# Patient Record
Sex: FEMALE | Race: OTHER | NOT HISPANIC OR LATINO | URBAN - METROPOLITAN AREA
[De-identification: names, ages, dates, MRNs, and addresses within clinical notes are randomized per-mention and may not be internally consistent; named-entity substitution may affect disease eponyms.]

---

## 2019-01-10 ENCOUNTER — OUTPATIENT (OUTPATIENT)
Dept: OUTPATIENT SERVICES | Facility: HOSPITAL | Age: 29
LOS: 1 days | End: 2019-01-10
Payer: COMMERCIAL

## 2019-01-10 VITALS
WEIGHT: 190.04 LBS | HEART RATE: 77 BPM | SYSTOLIC BLOOD PRESSURE: 123 MMHG | OXYGEN SATURATION: 100 % | TEMPERATURE: 98 F | HEIGHT: 61 IN | RESPIRATION RATE: 16 BRPM | DIASTOLIC BLOOD PRESSURE: 77 MMHG

## 2019-01-10 DIAGNOSIS — D25.9 LEIOMYOMA OF UTERUS, UNSPECIFIED: ICD-10-CM

## 2019-01-10 DIAGNOSIS — Z01.818 ENCOUNTER FOR OTHER PREPROCEDURAL EXAMINATION: ICD-10-CM

## 2019-01-10 LAB
ANION GAP SERPL CALC-SCNC: 9 MMOL/L — SIGNIFICANT CHANGE UP (ref 5–17)
APTT BLD: 29 SEC — SIGNIFICANT CHANGE UP (ref 27.5–36.3)
BUN SERPL-MCNC: 17 MG/DL — SIGNIFICANT CHANGE UP (ref 7–18)
CALCIUM SERPL-MCNC: 8.5 MG/DL — SIGNIFICANT CHANGE UP (ref 8.4–10.5)
CHLORIDE SERPL-SCNC: 108 MMOL/L — SIGNIFICANT CHANGE UP (ref 96–108)
CO2 SERPL-SCNC: 23 MMOL/L — SIGNIFICANT CHANGE UP (ref 22–31)
CREAT SERPL-MCNC: 0.75 MG/DL — SIGNIFICANT CHANGE UP (ref 0.5–1.3)
GLUCOSE SERPL-MCNC: 87 MG/DL — SIGNIFICANT CHANGE UP (ref 70–99)
HCT VFR BLD CALC: 31.5 % — LOW (ref 34.5–45)
HGB BLD-MCNC: 8.9 G/DL — LOW (ref 11.5–15.5)
INR BLD: 1.05 RATIO — SIGNIFICANT CHANGE UP (ref 0.88–1.16)
MCHC RBC-ENTMCNC: 18.7 PG — LOW (ref 27–34)
MCHC RBC-ENTMCNC: 28.3 GM/DL — LOW (ref 32–36)
MCV RBC AUTO: 66.1 FL — LOW (ref 80–100)
PLATELET # BLD AUTO: 487 K/UL — HIGH (ref 150–400)
POTASSIUM SERPL-MCNC: 4.4 MMOL/L — SIGNIFICANT CHANGE UP (ref 3.5–5.3)
POTASSIUM SERPL-SCNC: 4.4 MMOL/L — SIGNIFICANT CHANGE UP (ref 3.5–5.3)
PROTHROM AB SERPL-ACNC: 11.7 SEC — SIGNIFICANT CHANGE UP (ref 10–12.9)
RBC # BLD: 4.77 M/UL — SIGNIFICANT CHANGE UP (ref 3.8–5.2)
RBC # FLD: 18.5 % — HIGH (ref 10.3–14.5)
SODIUM SERPL-SCNC: 140 MMOL/L — SIGNIFICANT CHANGE UP (ref 135–145)
WBC # BLD: 9.2 K/UL — SIGNIFICANT CHANGE UP (ref 3.8–10.5)
WBC # FLD AUTO: 9.2 K/UL — SIGNIFICANT CHANGE UP (ref 3.8–10.5)

## 2019-01-10 PROCEDURE — 86850 RBC ANTIBODY SCREEN: CPT

## 2019-01-10 PROCEDURE — 36415 COLL VENOUS BLD VENIPUNCTURE: CPT

## 2019-01-10 PROCEDURE — 85610 PROTHROMBIN TIME: CPT

## 2019-01-10 PROCEDURE — 86900 BLOOD TYPING SEROLOGIC ABO: CPT

## 2019-01-10 PROCEDURE — 85730 THROMBOPLASTIN TIME PARTIAL: CPT

## 2019-01-10 PROCEDURE — 86901 BLOOD TYPING SEROLOGIC RH(D): CPT

## 2019-01-10 PROCEDURE — 85027 COMPLETE CBC AUTOMATED: CPT

## 2019-01-10 PROCEDURE — G0463: CPT

## 2019-01-10 PROCEDURE — 80048 BASIC METABOLIC PNL TOTAL CA: CPT

## 2019-01-10 NOTE — H&P PST ADULT - FAMILY HISTORY
Mother  Still living? Yes, Estimated age: 66  History of hypertension, Age at diagnosis: Age Unknown

## 2019-01-10 NOTE — H&P PST ADULT - RS GEN PE MLT RESP DETAILS PC
good air movement/respirations non-labored/airway patent/clear to auscultation bilaterally/normal/breath sounds equal

## 2019-01-10 NOTE — H&P PST ADULT - NSANTHOSAYNRD_GEN_A_CORE
No. KRYS screening performed.  STOP BANG Legend: 0-2 = LOW Risk; 3-4 = INTERMEDIATE Risk; 5-8 = HIGH Risk

## 2019-01-10 NOTE — H&P PST ADULT - HISTORY OF PRESENT ILLNESS
28 year old Black female in generally good health upon general physical PMD discovered fibroid. Pt was suggested to have a gyn consultation upon this finding. Pt than had sonogram and fibroids were diagnosed. Pt has had normal menstrual cycle until Dec where some associated pain on the first two days of cycle has occurred. Pt is scheduled for abdominal myomectomy on 1/24/2019.

## 2019-04-30 PROBLEM — D25.9 LEIOMYOMA OF UTERUS, UNSPECIFIED: Chronic | Status: ACTIVE | Noted: 2019-01-10

## 2019-04-30 PROBLEM — E66.9 OBESITY, UNSPECIFIED: Chronic | Status: ACTIVE | Noted: 2019-01-10

## 2019-05-22 ENCOUNTER — OUTPATIENT (OUTPATIENT)
Dept: OUTPATIENT SERVICES | Facility: HOSPITAL | Age: 29
LOS: 1 days | End: 2019-05-22
Payer: COMMERCIAL

## 2019-05-22 VITALS
TEMPERATURE: 98 F | OXYGEN SATURATION: 96 % | DIASTOLIC BLOOD PRESSURE: 81 MMHG | HEART RATE: 82 BPM | WEIGHT: 179.9 LBS | RESPIRATION RATE: 18 BRPM | SYSTOLIC BLOOD PRESSURE: 122 MMHG | HEIGHT: 62 IN

## 2019-05-22 DIAGNOSIS — Z01.818 ENCOUNTER FOR OTHER PREPROCEDURAL EXAMINATION: ICD-10-CM

## 2019-05-22 DIAGNOSIS — D25.9 LEIOMYOMA OF UTERUS, UNSPECIFIED: ICD-10-CM

## 2019-05-22 LAB — BLD GP AB SCN SERPL QL: SIGNIFICANT CHANGE UP

## 2019-05-22 PROCEDURE — 36415 COLL VENOUS BLD VENIPUNCTURE: CPT

## 2019-05-22 PROCEDURE — 86900 BLOOD TYPING SEROLOGIC ABO: CPT

## 2019-05-22 PROCEDURE — 86901 BLOOD TYPING SEROLOGIC RH(D): CPT

## 2019-05-22 PROCEDURE — 86850 RBC ANTIBODY SCREEN: CPT

## 2019-05-22 NOTE — H&P PST ADULT - NSICDXPROBLEM_GEN_ALL_CORE_FT
PROBLEM DIAGNOSES  Problem: Leiomyoma of uterus  Assessment and Plan: Patient is scheduled for abdominal myomectomy on 6/5/19

## 2019-05-22 NOTE — H&P PST ADULT - HISTORY OF PRESENT ILLNESS
28year old obese female with pmhx of anemia and leiomyoma of uterus presents with c/o dysmenorrhea, menorrhagia and abnormal vaginal bleeding x 3years. Patient is here today for presurgical testing for scheduled Abdominal Myomectomy on 6/5/19

## 2019-05-22 NOTE — H&P PST ADULT - ATTENDING COMMENTS
29 yo with fibroid uterus. For myomectomy via midline vertical incision. Discussed risks including infection, bleeding, transfusion, injury to intestines, bladder, and ureters. Patient understands risks and agrees to proceed with surgery.

## 2019-05-22 NOTE — H&P PST ADULT - NSICDXFAMILYHX_GEN_ALL_CORE_FT
FAMILY HISTORY:  Mother  Still living? Yes, Estimated age: 66  History of hypertension, Age at diagnosis: Age Unknown

## 2019-06-05 ENCOUNTER — INPATIENT (INPATIENT)
Facility: HOSPITAL | Age: 29
LOS: 2 days | Discharge: HOME | DRG: 742 | End: 2019-06-08
Attending: OBSTETRICS & GYNECOLOGY | Admitting: OBSTETRICS & GYNECOLOGY
Payer: COMMERCIAL

## 2019-06-05 VITALS
HEART RATE: 93 BPM | SYSTOLIC BLOOD PRESSURE: 137 MMHG | WEIGHT: 173.94 LBS | HEIGHT: 62 IN | OXYGEN SATURATION: 97 % | RESPIRATION RATE: 16 BRPM | DIASTOLIC BLOOD PRESSURE: 71 MMHG | TEMPERATURE: 98 F

## 2019-06-05 DIAGNOSIS — D25.9 LEIOMYOMA OF UTERUS, UNSPECIFIED: ICD-10-CM

## 2019-06-05 LAB
ANION GAP SERPL CALC-SCNC: 7 MMOL/L — SIGNIFICANT CHANGE UP (ref 5–17)
BASOPHILS # BLD AUTO: 0 K/UL — SIGNIFICANT CHANGE UP (ref 0–0.2)
BASOPHILS # BLD AUTO: 0.04 K/UL — SIGNIFICANT CHANGE UP (ref 0–0.2)
BASOPHILS NFR BLD AUTO: 0 % — SIGNIFICANT CHANGE UP (ref 0–2)
BASOPHILS NFR BLD AUTO: 0.1 % — SIGNIFICANT CHANGE UP (ref 0–2)
BLD GP AB SCN SERPL QL: SIGNIFICANT CHANGE UP
BUN SERPL-MCNC: 13 MG/DL — SIGNIFICANT CHANGE UP (ref 7–18)
CALCIUM SERPL-MCNC: 7.4 MG/DL — LOW (ref 8.4–10.5)
CHLORIDE SERPL-SCNC: 113 MMOL/L — HIGH (ref 96–108)
CO2 SERPL-SCNC: 21 MMOL/L — LOW (ref 22–31)
CREAT SERPL-MCNC: 0.89 MG/DL — SIGNIFICANT CHANGE UP (ref 0.5–1.3)
EOSINOPHIL # BLD AUTO: 0 K/UL — SIGNIFICANT CHANGE UP (ref 0–0.5)
EOSINOPHIL # BLD AUTO: 0.24 K/UL — SIGNIFICANT CHANGE UP (ref 0–0.5)
EOSINOPHIL NFR BLD AUTO: 0 % — SIGNIFICANT CHANGE UP (ref 0–6)
EOSINOPHIL NFR BLD AUTO: 1 % — SIGNIFICANT CHANGE UP (ref 0–6)
GLUCOSE SERPL-MCNC: 211 MG/DL — HIGH (ref 70–99)
HCG UR QL: NEGATIVE — SIGNIFICANT CHANGE UP
HCT VFR BLD CALC: 22.8 % — LOW (ref 34.5–45)
HCT VFR BLD CALC: 24.4 % — LOW (ref 34.5–45)
HGB BLD-MCNC: 7.5 G/DL — LOW (ref 11.5–15.5)
HGB BLD-MCNC: 8.2 G/DL — LOW (ref 11.5–15.5)
IMM GRANULOCYTES NFR BLD AUTO: 0.5 % — SIGNIFICANT CHANGE UP (ref 0–1.5)
LYMPHOCYTES # BLD AUTO: 0.94 K/UL — LOW (ref 1–3.3)
LYMPHOCYTES # BLD AUTO: 1.46 K/UL — SIGNIFICANT CHANGE UP (ref 1–3.3)
LYMPHOCYTES # BLD AUTO: 3.4 % — LOW (ref 13–44)
LYMPHOCYTES # BLD AUTO: 6 % — LOW (ref 13–44)
MCHC RBC-ENTMCNC: 25.8 PG — LOW (ref 27–34)
MCHC RBC-ENTMCNC: 25.9 PG — LOW (ref 27–34)
MCHC RBC-ENTMCNC: 32.9 GM/DL — SIGNIFICANT CHANGE UP (ref 32–36)
MCHC RBC-ENTMCNC: 33.6 GM/DL — SIGNIFICANT CHANGE UP (ref 32–36)
MCV RBC AUTO: 76.7 FL — LOW (ref 80–100)
MCV RBC AUTO: 78.6 FL — LOW (ref 80–100)
MONOCYTES # BLD AUTO: 0.97 K/UL — HIGH (ref 0–0.9)
MONOCYTES # BLD AUTO: 1.8 K/UL — HIGH (ref 0–0.9)
MONOCYTES NFR BLD AUTO: 4 % — SIGNIFICANT CHANGE UP (ref 2–14)
MONOCYTES NFR BLD AUTO: 6.5 % — SIGNIFICANT CHANGE UP (ref 2–14)
NEUTROPHILS # BLD AUTO: 20.67 K/UL — HIGH (ref 1.8–7.4)
NEUTROPHILS # BLD AUTO: 24.63 K/UL — HIGH (ref 1.8–7.4)
NEUTROPHILS NFR BLD AUTO: 83 % — HIGH (ref 43–77)
NEUTROPHILS NFR BLD AUTO: 89.5 % — HIGH (ref 43–77)
NRBC # BLD: 0 /100 WBCS — SIGNIFICANT CHANGE UP (ref 0–0)
PLATELET # BLD AUTO: 255 K/UL — SIGNIFICANT CHANGE UP (ref 150–400)
PLATELET # BLD AUTO: 264 K/UL — SIGNIFICANT CHANGE UP (ref 150–400)
POTASSIUM SERPL-MCNC: 3.9 MMOL/L — SIGNIFICANT CHANGE UP (ref 3.5–5.3)
POTASSIUM SERPL-SCNC: 3.9 MMOL/L — SIGNIFICANT CHANGE UP (ref 3.5–5.3)
RBC # BLD: 2.9 M/UL — LOW (ref 3.8–5.2)
RBC # BLD: 3.18 M/UL — LOW (ref 3.8–5.2)
RBC # FLD: 18.2 % — HIGH (ref 10.3–14.5)
RBC # FLD: 18.6 % — HIGH (ref 10.3–14.5)
SODIUM SERPL-SCNC: 141 MMOL/L — SIGNIFICANT CHANGE UP (ref 135–145)
WBC # BLD: 24.32 K/UL — HIGH (ref 3.8–10.5)
WBC # BLD: 27.55 K/UL — HIGH (ref 3.8–10.5)
WBC # FLD AUTO: 24.32 K/UL — HIGH (ref 3.8–10.5)
WBC # FLD AUTO: 27.55 K/UL — HIGH (ref 3.8–10.5)

## 2019-06-05 PROCEDURE — 58146 MYOMECTOMY ABDOM COMPLEX: CPT | Mod: AS

## 2019-06-05 RX ORDER — KETOROLAC TROMETHAMINE 30 MG/ML
30 SYRINGE (ML) INJECTION
Refills: 0 | Status: DISCONTINUED | OUTPATIENT
Start: 2019-06-05 | End: 2019-06-06

## 2019-06-05 RX ORDER — HYDROMORPHONE HYDROCHLORIDE 2 MG/ML
0.5 INJECTION INTRAMUSCULAR; INTRAVENOUS; SUBCUTANEOUS
Refills: 0 | Status: DISCONTINUED | OUTPATIENT
Start: 2019-06-05 | End: 2019-06-05

## 2019-06-05 RX ORDER — ACETAMINOPHEN 500 MG
1000 TABLET ORAL ONCE
Refills: 0 | Status: COMPLETED | OUTPATIENT
Start: 2019-06-05 | End: 2019-06-05

## 2019-06-05 RX ORDER — SODIUM CHLORIDE 9 MG/ML
500 INJECTION INTRAMUSCULAR; INTRAVENOUS; SUBCUTANEOUS ONCE
Refills: 0 | Status: COMPLETED | OUTPATIENT
Start: 2019-06-05 | End: 2019-06-05

## 2019-06-05 RX ORDER — SENNA PLUS 8.6 MG/1
2 TABLET ORAL AT BEDTIME
Refills: 0 | Status: DISCONTINUED | OUTPATIENT
Start: 2019-06-05 | End: 2019-06-08

## 2019-06-05 RX ORDER — ZOLPIDEM TARTRATE 10 MG/1
5 TABLET ORAL AT BEDTIME
Refills: 0 | Status: DISCONTINUED | OUTPATIENT
Start: 2019-06-05 | End: 2019-06-08

## 2019-06-05 RX ORDER — PANTOPRAZOLE SODIUM 20 MG/1
40 TABLET, DELAYED RELEASE ORAL
Refills: 0 | Status: DISCONTINUED | OUTPATIENT
Start: 2019-06-05 | End: 2019-06-08

## 2019-06-05 RX ORDER — HYDROMORPHONE HYDROCHLORIDE 2 MG/ML
1 INJECTION INTRAMUSCULAR; INTRAVENOUS; SUBCUTANEOUS
Refills: 0 | Status: DISCONTINUED | OUTPATIENT
Start: 2019-06-05 | End: 2019-06-05

## 2019-06-05 RX ORDER — METOCLOPRAMIDE HCL 10 MG
10 TABLET ORAL ONCE
Refills: 0 | Status: DISCONTINUED | OUTPATIENT
Start: 2019-06-05 | End: 2019-06-05

## 2019-06-05 RX ORDER — ACETAMINOPHEN 500 MG
1000 TABLET ORAL ONCE
Refills: 0 | Status: COMPLETED | OUTPATIENT
Start: 2019-06-06 | End: 2019-06-06

## 2019-06-05 RX ORDER — ONDANSETRON 8 MG/1
4 TABLET, FILM COATED ORAL ONCE
Refills: 0 | Status: DISCONTINUED | OUTPATIENT
Start: 2019-06-05 | End: 2019-06-05

## 2019-06-05 RX ORDER — SIMETHICONE 80 MG/1
80 TABLET, CHEWABLE ORAL EVERY 6 HOURS
Refills: 0 | Status: DISCONTINUED | OUTPATIENT
Start: 2019-06-05 | End: 2019-06-08

## 2019-06-05 RX ORDER — INDOMETHACIN 50 MG
25 CAPSULE ORAL
Refills: 0 | Status: COMPLETED | OUTPATIENT
Start: 2019-06-06 | End: 2019-06-07

## 2019-06-05 RX ORDER — MORPHINE SULFATE 50 MG/1
4 CAPSULE, EXTENDED RELEASE ORAL EVERY 4 HOURS
Refills: 0 | Status: DISCONTINUED | OUTPATIENT
Start: 2019-06-05 | End: 2019-06-06

## 2019-06-05 RX ORDER — MAGNESIUM HYDROXIDE 400 MG/1
30 TABLET, CHEWABLE ORAL DAILY
Refills: 0 | Status: DISCONTINUED | OUTPATIENT
Start: 2019-06-05 | End: 2019-06-08

## 2019-06-05 RX ORDER — SODIUM CHLORIDE 9 MG/ML
3 INJECTION INTRAMUSCULAR; INTRAVENOUS; SUBCUTANEOUS EVERY 8 HOURS
Refills: 0 | Status: DISCONTINUED | OUTPATIENT
Start: 2019-06-05 | End: 2019-06-05

## 2019-06-05 RX ORDER — SODIUM CHLORIDE 9 MG/ML
1000 INJECTION, SOLUTION INTRAVENOUS
Refills: 0 | Status: DISCONTINUED | OUTPATIENT
Start: 2019-06-05 | End: 2019-06-05

## 2019-06-05 RX ORDER — ASCORBIC ACID 60 MG
500 TABLET,CHEWABLE ORAL DAILY
Refills: 0 | Status: DISCONTINUED | OUTPATIENT
Start: 2019-06-05 | End: 2019-06-08

## 2019-06-05 RX ORDER — DOCUSATE SODIUM 100 MG
100 CAPSULE ORAL
Refills: 0 | Status: DISCONTINUED | OUTPATIENT
Start: 2019-06-05 | End: 2019-06-08

## 2019-06-05 RX ORDER — SODIUM CHLORIDE 9 MG/ML
1000 INJECTION INTRAMUSCULAR; INTRAVENOUS; SUBCUTANEOUS ONCE
Refills: 0 | Status: COMPLETED | OUTPATIENT
Start: 2019-06-05 | End: 2019-06-05

## 2019-06-05 RX ORDER — FERROUS SULFATE 325(65) MG
325 TABLET ORAL THREE TIMES A DAY
Refills: 0 | Status: DISCONTINUED | OUTPATIENT
Start: 2019-06-05 | End: 2019-06-08

## 2019-06-05 RX ADMIN — Medication 500 MILLIGRAM(S): at 18:45

## 2019-06-05 RX ADMIN — Medication 1000 MILLIGRAM(S): at 12:20

## 2019-06-05 RX ADMIN — SENNA PLUS 2 TABLET(S): 8.6 TABLET ORAL at 21:27

## 2019-06-05 RX ADMIN — Medication 400 MILLIGRAM(S): at 11:36

## 2019-06-05 RX ADMIN — SODIUM CHLORIDE 3 MILLILITER(S): 9 INJECTION INTRAMUSCULAR; INTRAVENOUS; SUBCUTANEOUS at 07:03

## 2019-06-05 RX ADMIN — SIMETHICONE 80 MILLIGRAM(S): 80 TABLET, CHEWABLE ORAL at 18:45

## 2019-06-05 RX ADMIN — Medication 30 MILLIGRAM(S): at 23:13

## 2019-06-05 RX ADMIN — HYDROMORPHONE HYDROCHLORIDE 1 MILLIGRAM(S): 2 INJECTION INTRAMUSCULAR; INTRAVENOUS; SUBCUTANEOUS at 14:31

## 2019-06-05 RX ADMIN — HYDROMORPHONE HYDROCHLORIDE 0.5 MILLIGRAM(S): 2 INJECTION INTRAMUSCULAR; INTRAVENOUS; SUBCUTANEOUS at 12:35

## 2019-06-05 RX ADMIN — HYDROMORPHONE HYDROCHLORIDE 0.5 MILLIGRAM(S): 2 INJECTION INTRAMUSCULAR; INTRAVENOUS; SUBCUTANEOUS at 12:20

## 2019-06-05 RX ADMIN — MORPHINE SULFATE 4 MILLIGRAM(S): 50 CAPSULE, EXTENDED RELEASE ORAL at 21:54

## 2019-06-05 RX ADMIN — HYDROMORPHONE HYDROCHLORIDE 1 MILLIGRAM(S): 2 INJECTION INTRAMUSCULAR; INTRAVENOUS; SUBCUTANEOUS at 16:57

## 2019-06-05 RX ADMIN — SIMETHICONE 80 MILLIGRAM(S): 80 TABLET, CHEWABLE ORAL at 23:14

## 2019-06-05 RX ADMIN — Medication 400 MILLIGRAM(S): at 18:45

## 2019-06-05 RX ADMIN — MAGNESIUM HYDROXIDE 30 MILLILITER(S): 400 TABLET, CHEWABLE ORAL at 18:45

## 2019-06-05 RX ADMIN — SODIUM CHLORIDE 1000 MILLILITER(S): 9 INJECTION INTRAMUSCULAR; INTRAVENOUS; SUBCUTANEOUS at 14:31

## 2019-06-05 RX ADMIN — Medication 1000 MILLIGRAM(S): at 19:15

## 2019-06-05 RX ADMIN — MORPHINE SULFATE 4 MILLIGRAM(S): 50 CAPSULE, EXTENDED RELEASE ORAL at 21:24

## 2019-06-05 RX ADMIN — Medication 30 MILLIGRAM(S): at 23:43

## 2019-06-05 RX ADMIN — HYDROMORPHONE HYDROCHLORIDE 0.5 MILLIGRAM(S): 2 INJECTION INTRAMUSCULAR; INTRAVENOUS; SUBCUTANEOUS at 11:35

## 2019-06-05 RX ADMIN — Medication 100 MILLIGRAM(S): at 18:45

## 2019-06-05 RX ADMIN — HYDROMORPHONE HYDROCHLORIDE 1 MILLIGRAM(S): 2 INJECTION INTRAMUSCULAR; INTRAVENOUS; SUBCUTANEOUS at 16:29

## 2019-06-05 RX ADMIN — HYDROMORPHONE HYDROCHLORIDE 0.5 MILLIGRAM(S): 2 INJECTION INTRAMUSCULAR; INTRAVENOUS; SUBCUTANEOUS at 12:50

## 2019-06-05 RX ADMIN — Medication 325 MILLIGRAM(S): at 21:27

## 2019-06-05 RX ADMIN — SODIUM CHLORIDE 2000 MILLILITER(S): 9 INJECTION INTRAMUSCULAR; INTRAVENOUS; SUBCUTANEOUS at 16:00

## 2019-06-05 NOTE — CHART NOTE - NSCHARTNOTEFT_GEN_A_CORE
pt seen in pacu bed 2 w dr velasquez  pt alert and oriented x3  c/o incisional pain    rpt cbc at 4pm                       7.5    27.55 )-----------( 255      ( 05 Jun 2019 16:05 )             22.8     Vital Signs Last 24 Hrs  T(C): 36.9 (05 Jun 2019 11:20), Max: 36.9 (05 Jun 2019 11:20)  T(F): 98.4 (05 Jun 2019 11:20), Max: 98.4 (05 Jun 2019 11:20)  HR: 117 (05 Jun 2019 16:30) (93 - 147)  BP: 104/67 (05 Jun 2019 16:30) (92/51 - 137/71)  BP(mean): 77 (05 Jun 2019 16:30) (63 - 81)  RR: 15 (05 Jun 2019 16:30) (13 - 20)  SpO2: 100% (05 Jun 2019 16:30) (96% - 100%)    pt alert and oriented x3 nad  abd dressing c/d abd soft non distended  pelvic no active vag bleed scant on pad dark red  +venodyne boots b/l                          7.5    27.55 )-----------( 255      ( 05 Jun 2019 16:05 )             22.8   06-05    141  |  113<H>  |  13  ----------------------------<  211<H>  3.9   |  21<L>  |  0.89    Ca    7.4<L>      05 Jun 2019 11:48    POD#0 s/p abd myomectomy  ebl 1000ml  symptomatic anemia post op cause  -2units prbc ordered as instructed by dr velasquez  -consent for blood transfusion

## 2019-06-06 DIAGNOSIS — D50.0 IRON DEFICIENCY ANEMIA SECONDARY TO BLOOD LOSS (CHRONIC): ICD-10-CM

## 2019-06-06 DIAGNOSIS — Z98.890 OTHER SPECIFIED POSTPROCEDURAL STATES: ICD-10-CM

## 2019-06-06 LAB
ANION GAP SERPL CALC-SCNC: 7 MMOL/L — SIGNIFICANT CHANGE UP (ref 5–17)
BASOPHILS # BLD AUTO: 0.01 K/UL — SIGNIFICANT CHANGE UP (ref 0–0.2)
BASOPHILS # BLD AUTO: 0.03 K/UL — SIGNIFICANT CHANGE UP (ref 0–0.2)
BASOPHILS NFR BLD AUTO: 0.1 % — SIGNIFICANT CHANGE UP (ref 0–2)
BASOPHILS NFR BLD AUTO: 0.2 % — SIGNIFICANT CHANGE UP (ref 0–2)
BUN SERPL-MCNC: 10 MG/DL — SIGNIFICANT CHANGE UP (ref 7–18)
CALCIUM SERPL-MCNC: 7.4 MG/DL — LOW (ref 8.4–10.5)
CHLORIDE SERPL-SCNC: 109 MMOL/L — HIGH (ref 96–108)
CO2 SERPL-SCNC: 24 MMOL/L — SIGNIFICANT CHANGE UP (ref 22–31)
CREAT SERPL-MCNC: 0.87 MG/DL — SIGNIFICANT CHANGE UP (ref 0.5–1.3)
EOSINOPHIL # BLD AUTO: 0.02 K/UL — SIGNIFICANT CHANGE UP (ref 0–0.5)
EOSINOPHIL # BLD AUTO: 0.02 K/UL — SIGNIFICANT CHANGE UP (ref 0–0.5)
EOSINOPHIL NFR BLD AUTO: 0.1 % — SIGNIFICANT CHANGE UP (ref 0–6)
EOSINOPHIL NFR BLD AUTO: 0.1 % — SIGNIFICANT CHANGE UP (ref 0–6)
GLUCOSE SERPL-MCNC: 127 MG/DL — HIGH (ref 70–99)
HCT VFR BLD CALC: 17.8 % — CRITICAL LOW (ref 34.5–45)
HCT VFR BLD CALC: 27.4 % — LOW (ref 34.5–45)
HGB BLD-MCNC: 6.1 G/DL — CRITICAL LOW (ref 11.5–15.5)
HGB BLD-MCNC: 9.1 G/DL — LOW (ref 11.5–15.5)
IMM GRANULOCYTES NFR BLD AUTO: 0.4 % — SIGNIFICANT CHANGE UP (ref 0–1.5)
IMM GRANULOCYTES NFR BLD AUTO: 0.6 % — SIGNIFICANT CHANGE UP (ref 0–1.5)
LYMPHOCYTES # BLD AUTO: 13.1 % — SIGNIFICANT CHANGE UP (ref 13–44)
LYMPHOCYTES # BLD AUTO: 18.3 % — SIGNIFICANT CHANGE UP (ref 13–44)
LYMPHOCYTES # BLD AUTO: 2.38 K/UL — SIGNIFICANT CHANGE UP (ref 1–3.3)
LYMPHOCYTES # BLD AUTO: 2.63 K/UL — SIGNIFICANT CHANGE UP (ref 1–3.3)
MCHC RBC-ENTMCNC: 25.6 PG — LOW (ref 27–34)
MCHC RBC-ENTMCNC: 26.3 PG — LOW (ref 27–34)
MCHC RBC-ENTMCNC: 33.2 GM/DL — SIGNIFICANT CHANGE UP (ref 32–36)
MCHC RBC-ENTMCNC: 34.3 GM/DL — SIGNIFICANT CHANGE UP (ref 32–36)
MCV RBC AUTO: 76.7 FL — LOW (ref 80–100)
MCV RBC AUTO: 77 FL — LOW (ref 80–100)
MONOCYTES # BLD AUTO: 1.04 K/UL — HIGH (ref 0–0.9)
MONOCYTES # BLD AUTO: 1.56 K/UL — HIGH (ref 0–0.9)
MONOCYTES NFR BLD AUTO: 7.2 % — SIGNIFICANT CHANGE UP (ref 2–14)
MONOCYTES NFR BLD AUTO: 8.6 % — SIGNIFICANT CHANGE UP (ref 2–14)
NEUTROPHILS # BLD AUTO: 10.59 K/UL — HIGH (ref 1.8–7.4)
NEUTROPHILS # BLD AUTO: 14.15 K/UL — HIGH (ref 1.8–7.4)
NEUTROPHILS NFR BLD AUTO: 73.7 % — SIGNIFICANT CHANGE UP (ref 43–77)
NEUTROPHILS NFR BLD AUTO: 77.6 % — HIGH (ref 43–77)
NRBC # BLD: 0 /100 WBCS — SIGNIFICANT CHANGE UP (ref 0–0)
NRBC # BLD: 0 /100 WBCS — SIGNIFICANT CHANGE UP (ref 0–0)
PLATELET # BLD AUTO: 144 K/UL — LOW (ref 150–400)
PLATELET # BLD AUTO: 195 K/UL — SIGNIFICANT CHANGE UP (ref 150–400)
POTASSIUM SERPL-MCNC: 4 MMOL/L — SIGNIFICANT CHANGE UP (ref 3.5–5.3)
POTASSIUM SERPL-SCNC: 4 MMOL/L — SIGNIFICANT CHANGE UP (ref 3.5–5.3)
RBC # BLD: 2.32 M/UL — LOW (ref 3.8–5.2)
RBC # BLD: 3.56 M/UL — LOW (ref 3.8–5.2)
RBC # FLD: 17.2 % — HIGH (ref 10.3–14.5)
RBC # FLD: 17.5 % — HIGH (ref 10.3–14.5)
SODIUM SERPL-SCNC: 140 MMOL/L — SIGNIFICANT CHANGE UP (ref 135–145)
TSH SERPL-MCNC: 1.01 UU/ML — SIGNIFICANT CHANGE UP (ref 0.34–4.82)
WBC # BLD: 14.37 K/UL — HIGH (ref 3.8–10.5)
WBC # BLD: 18.22 K/UL — HIGH (ref 3.8–10.5)
WBC # FLD AUTO: 14.37 K/UL — HIGH (ref 3.8–10.5)
WBC # FLD AUTO: 18.22 K/UL — HIGH (ref 3.8–10.5)

## 2019-06-06 PROCEDURE — 93010 ELECTROCARDIOGRAM REPORT: CPT

## 2019-06-06 PROCEDURE — 71275 CT ANGIOGRAPHY CHEST: CPT | Mod: 26

## 2019-06-06 RX ORDER — OXYCODONE HYDROCHLORIDE 5 MG/1
10 TABLET ORAL EVERY 4 HOURS
Refills: 0 | Status: DISCONTINUED | OUTPATIENT
Start: 2019-06-06 | End: 2019-06-08

## 2019-06-06 RX ORDER — OXYCODONE HYDROCHLORIDE 5 MG/1
5 TABLET ORAL
Refills: 0 | Status: DISCONTINUED | OUTPATIENT
Start: 2019-06-06 | End: 2019-06-08

## 2019-06-06 RX ORDER — ACETAMINOPHEN 500 MG
975 TABLET ORAL EVERY 8 HOURS
Refills: 0 | Status: DISCONTINUED | OUTPATIENT
Start: 2019-06-06 | End: 2019-06-08

## 2019-06-06 RX ORDER — SODIUM CHLORIDE 9 MG/ML
1000 INJECTION, SOLUTION INTRAVENOUS
Refills: 0 | Status: DISCONTINUED | OUTPATIENT
Start: 2019-06-06 | End: 2019-06-08

## 2019-06-06 RX ADMIN — OXYCODONE HYDROCHLORIDE 10 MILLIGRAM(S): 5 TABLET ORAL at 18:50

## 2019-06-06 RX ADMIN — Medication 30 MILLIGRAM(S): at 04:36

## 2019-06-06 RX ADMIN — OXYCODONE HYDROCHLORIDE 5 MILLIGRAM(S): 5 TABLET ORAL at 19:43

## 2019-06-06 RX ADMIN — Medication 975 MILLIGRAM(S): at 16:40

## 2019-06-06 RX ADMIN — MORPHINE SULFATE 4 MILLIGRAM(S): 50 CAPSULE, EXTENDED RELEASE ORAL at 09:43

## 2019-06-06 RX ADMIN — SIMETHICONE 80 MILLIGRAM(S): 80 TABLET, CHEWABLE ORAL at 06:33

## 2019-06-06 RX ADMIN — SENNA PLUS 2 TABLET(S): 8.6 TABLET ORAL at 21:39

## 2019-06-06 RX ADMIN — Medication 25 MILLIGRAM(S): at 12:08

## 2019-06-06 RX ADMIN — OXYCODONE HYDROCHLORIDE 5 MILLIGRAM(S): 5 TABLET ORAL at 20:52

## 2019-06-06 RX ADMIN — Medication 1000 MILLIGRAM(S): at 06:51

## 2019-06-06 RX ADMIN — Medication 325 MILLIGRAM(S): at 21:39

## 2019-06-06 RX ADMIN — MORPHINE SULFATE 4 MILLIGRAM(S): 50 CAPSULE, EXTENDED RELEASE ORAL at 08:17

## 2019-06-06 RX ADMIN — Medication 25 MILLIGRAM(S): at 17:36

## 2019-06-06 RX ADMIN — Medication 400 MILLIGRAM(S): at 06:34

## 2019-06-06 RX ADMIN — Medication 100 MILLIGRAM(S): at 17:36

## 2019-06-06 RX ADMIN — Medication 30 MILLIGRAM(S): at 05:06

## 2019-06-06 RX ADMIN — MORPHINE SULFATE 4 MILLIGRAM(S): 50 CAPSULE, EXTENDED RELEASE ORAL at 02:21

## 2019-06-06 RX ADMIN — MORPHINE SULFATE 4 MILLIGRAM(S): 50 CAPSULE, EXTENDED RELEASE ORAL at 01:51

## 2019-06-06 RX ADMIN — Medication 1 TABLET(S): at 12:09

## 2019-06-06 RX ADMIN — Medication 500 MILLIGRAM(S): at 12:08

## 2019-06-06 RX ADMIN — SIMETHICONE 80 MILLIGRAM(S): 80 TABLET, CHEWABLE ORAL at 17:36

## 2019-06-06 RX ADMIN — Medication 25 MILLIGRAM(S): at 18:50

## 2019-06-06 RX ADMIN — Medication 25 MILLIGRAM(S): at 21:39

## 2019-06-06 RX ADMIN — PANTOPRAZOLE SODIUM 40 MILLIGRAM(S): 20 TABLET, DELAYED RELEASE ORAL at 06:33

## 2019-06-06 RX ADMIN — Medication 100 MILLIGRAM(S): at 06:33

## 2019-06-06 RX ADMIN — SIMETHICONE 80 MILLIGRAM(S): 80 TABLET, CHEWABLE ORAL at 12:09

## 2019-06-06 RX ADMIN — OXYCODONE HYDROCHLORIDE 10 MILLIGRAM(S): 5 TABLET ORAL at 17:36

## 2019-06-06 RX ADMIN — Medication 325 MILLIGRAM(S): at 06:33

## 2019-06-06 RX ADMIN — Medication 25 MILLIGRAM(S): at 14:20

## 2019-06-06 RX ADMIN — OXYCODONE HYDROCHLORIDE 10 MILLIGRAM(S): 5 TABLET ORAL at 13:18

## 2019-06-06 RX ADMIN — Medication 975 MILLIGRAM(S): at 21:41

## 2019-06-06 RX ADMIN — Medication 325 MILLIGRAM(S): at 15:02

## 2019-06-06 RX ADMIN — OXYCODONE HYDROCHLORIDE 10 MILLIGRAM(S): 5 TABLET ORAL at 12:09

## 2019-06-06 RX ADMIN — Medication 975 MILLIGRAM(S): at 15:02

## 2019-06-06 NOTE — PROGRESS NOTE ADULT - SUBJECTIVE AND OBJECTIVE BOX
POD 1  Doing well  No n/v    vss afeb at 8 AM today  abd:c/d/i, ND  ext:NT    hg 9.1 at 6 AM today    a/p:stable. ambulate. inc spirometer, reg diet

## 2019-06-06 NOTE — CHART NOTE - NSCHARTNOTEFT_GEN_A_CORE
Complete Blood Count + Automated Diff (06.06.19 @ 20:31)    WBC Count: 14.37 K/uL    RBC Count: 2.32 M/uL    Hemoglobin: 6.1: TYPE:(C=Critical, N=Notification, A=Abnormal) _C  TESTS: _HGB/HCT  DATE/TIME CALLED: _06/06/19 20:47  06/06/19 20:51 2ND CALL  CALLED TO: BETHANY ZAFAR  READ BACK (2 Patient Identifiers)(Y/N): _Y  READ BACK VALUES (Y/N): _Y  CALLED BY: ERVIN, 06/06/19 20:52 g/dL    Hematocrit: 17.8: Test repeated. %    Mean Cell Volume: 76.7 fl    Mean Cell Hemoglobin: 26.3 pg    Mean Cell Hemoglobin Conc: 34.3 gm/dL    Red Cell Distrib Width: 17.5 %    Platelet Count - Automated: 144 K/uL    Auto Neutrophil #: 10.59 K/uL    Auto Lymphocyte #: 2.63 K/uL    Auto Monocyte #: 1.04 K/uL    Auto Eosinophil #: 0.02 K/uL    Auto Basophil #: 0.01 K/uL    Auto Neutrophil %: 73.7: Differential percentages must be correlated with absolute numbers for  clinical significance. %    Auto Lymphocyte %: 18.3 %    Auto Monocyte %: 7.2 %    Auto Eosinophil %: 0.1 %    Auto Basophil %: 0.1 %    Auto Immature Granulocyte %: 0.6 %    Nucleated RBC: 0 /100 WBCs    < from: CT Angio Chest w/ IV Cont (06.06.19 @ 21:18) >    EXAM DATE/TIME:    6/6/2019 8:59 PM     CLINICAL HISTORY:    28 years old, female; Signs and symptoms; Tachypnea and other:   Tachycardia;   Prior surgery; Surgery date: Post-operative (0-2 days); Surgery type: S/P   abdominal myomectomy 6/4/19     TECHNIQUE:    Imaging protocol: Axial computed tomographic angiography images of the   chest   with intravenous contrast using CT angiography protocol. Coronal and   sagittal   reformatted images were created and reviewed.    3D rendering: MIP reconstructed images were created and reviewed.    Contrast material: OMNI 350; Contrast volume: 56 ml; Contrast route: IV;     COMPARISON:    No relevant prior studies available.     FINDINGS:    Pulmonary arteries: Adequate contrast enhancement of the pulmonary   arteries.    Aorta: No evidence of thoracic aortic dissection.    Lungs: No evidence endobronchial lesion. Mild-moderate bilateral lower   lobe   air space opacity-atelectasis/scarring.    Pleural space: No evidence of pneumothorax.    Heart: Heart appears within normal limits, no pericardial effusion. No   evidence of filling defects in the cardiac chambers.    Lymph nodes: Unremarkable. No enlarged lymph nodes.    Bones/joints: Musculoskeletal structures appear intact.    Soft tissues: Unremarkable.     IMPRESSION:   1. No evidence of pulmonary embolism.   2. No evidence of thoracic aortic dissection.   3. Mild-moderate bilateral lower lobeair space   opacity-atelectasis/scarring.    < end of copied text >    Spoke with Dr Armendariz instructed to transfuse 3units PRBC and 1 FFP.   CTA negative for PE  cont close monitor  Dr Loredo notified

## 2019-06-06 NOTE — PROGRESS NOTE ADULT - PROBLEM SELECTOR PLAN 1
A/P: 27y/o s/p abdominal myomectomy with acute blood loss anemia, s/p 2units PRBCs, stable POD#1  -cont pain management, postop care  -rpt cbc in am  -iron tid  -advance diet to regular after flatus  -oob, encourage ambulation  -lovenox DVT ppx  -f/u void, incentive spirometry   -d/w Dr. Armendariz A/P: 27y/o s/p abdominal myomectomy with acute blood loss anemia, s/p 2units PRBCs, stable POD#1  -cont pain management, postop care  -rpt cbc in am  -iron tid  -advance diet to regular after flatus  -oob, encourage ambulation  -venodynes while in bed  -f/u void, incentive spirometry   -d/w Dr. Armendariz

## 2019-06-06 NOTE — PROGRESS NOTE ADULT - ASSESSMENT
A/P: 29y/o s/p abdominal myomectomy with acute blood loss anemia, s/p 2units PRBCs, stable POD#1  -cont pain management, postop care  -rpt cbc in am  -iron tid  -advance diet to regular after flatus  -oob, encourage ambulation  -lovenox DVT ppx  -f/u void, incentive spirometry   -d/w Dr. Armendariz A/P: 29y/o s/p abdominal myomectomy with acute blood loss anemia, s/p 2units PRBCs, stable POD#1  -cont pain management, postop care  -rpt cbc in am  -iron tid  -advance diet to regular after flatus  -oob, encourage ambulation  -venodynes while in bed  -f/u void, incentive spirometry   -d/w Dr. Armendariz

## 2019-06-06 NOTE — PROGRESS NOTE ADULT - SUBJECTIVE AND OBJECTIVE BOX
GYN PA Progress Note POD#1    Patient seen at bedside resting comfortably offers no new complaints. Buchanan just removed, - flatus; tolerating clear diet. Denies HA, CP, SOB, N/V/D,  no bm; dizziness, palpitations, worsening abdominal pain, worsening vaginal bleeding, or any other concerns.     Vital Signs Last 24 Hrs  T(C): 36.8 (06 Jun 2019 08:56), Max: 37.1 (05 Jun 2019 18:25)  T(F): 98.2 (06 Jun 2019 08:56), Max: 98.8 (05 Jun 2019 18:25)  HR: 89 (06 Jun 2019 08:56) (89 - 147)  BP: 126/61 (06 Jun 2019 08:56) (92/51 - 126/61)  BP(mean): 77 (05 Jun 2019 16:30) (63 - 81)  RR: 17 (06 Jun 2019 08:56) (13 - 20)  SpO2: 98% (06 Jun 2019 08:56) (96% - 100%)    Gen: A&O x 3, NAD  Chest: CTA B/L  Cardiac: S1,S2  RRR  Abdomen: +BS; soft; Nontender, nondistended, staples in place with vertical incision, c/d/i  Gyn: no vaginal bleeding   Extremities: Nontender, no worsening edema, venodynes                          9.1    18.22 )-----------( 195      ( 06 Jun 2019 06:36 )             27.4     06-06    140  |  109<H>  |  10  ----------------------------<  127<H>  4.0   |  24  |  0.87    Ca    7.4<L>      06 Jun 2019 06:36

## 2019-06-06 NOTE — CHART NOTE - NSCHARTNOTEFT_GEN_A_CORE
Nurse notified PA of pt having 's and tachypnea 22. Pt has been tachycardic since last night. bp 131/55 T 38  Pt seen at bedside states she feels slightly SOB and feels her heart racing. Pt also states she has some abd pain and wants pain meds  EBL 1000 s/p 2units PRBC     Lungs: CTA b/l  Card: tachycardic  Pelvic: no active VB  Abd: +bs, soft/nt, ND, incision C/D/I staples in place    EKG sinus tach  a/p POD # 1 s/p abd myomectomy w acute blood loss anemia; r/o PE  cbc/tsh ordered   IVF  CTA ordered r/o PE  dopplers LE b/l ordered r/o DVT  cont close monitor  Medicine Dr Corbin paged [phone] multiple times no answer  Dr Loredo called will see pt in am  Dr Armendariz notified   Dr Agudelo attending on call notified

## 2019-06-07 LAB
HCT VFR BLD CALC: 28.2 % — LOW (ref 34.5–45)
HCT VFR BLD CALC: 28.7 % — LOW (ref 34.5–45)
HGB BLD-MCNC: 9.7 G/DL — LOW (ref 11.5–15.5)
HGB BLD-MCNC: 9.9 G/DL — LOW (ref 11.5–15.5)
MCHC RBC-ENTMCNC: 27.2 PG — SIGNIFICANT CHANGE UP (ref 27–34)
MCHC RBC-ENTMCNC: 27.2 PG — SIGNIFICANT CHANGE UP (ref 27–34)
MCHC RBC-ENTMCNC: 34.4 GM/DL — SIGNIFICANT CHANGE UP (ref 32–36)
MCHC RBC-ENTMCNC: 34.5 GM/DL — SIGNIFICANT CHANGE UP (ref 32–36)
MCV RBC AUTO: 78.8 FL — LOW (ref 80–100)
MCV RBC AUTO: 79.2 FL — LOW (ref 80–100)
NRBC # BLD: 0 /100 WBCS — SIGNIFICANT CHANGE UP (ref 0–0)
NRBC # BLD: 0 /100 WBCS — SIGNIFICANT CHANGE UP (ref 0–0)
PLATELET # BLD AUTO: 166 K/UL — SIGNIFICANT CHANGE UP (ref 150–400)
PLATELET # BLD AUTO: 170 K/UL — SIGNIFICANT CHANGE UP (ref 150–400)
RBC # BLD: 3.56 M/UL — LOW (ref 3.8–5.2)
RBC # BLD: 3.64 M/UL — LOW (ref 3.8–5.2)
RBC # FLD: 16.9 % — HIGH (ref 10.3–14.5)
RBC # FLD: 16.9 % — HIGH (ref 10.3–14.5)
WBC # BLD: 13.7 K/UL — HIGH (ref 3.8–10.5)
WBC # BLD: 13.95 K/UL — HIGH (ref 3.8–10.5)
WBC # FLD AUTO: 13.7 K/UL — HIGH (ref 3.8–10.5)
WBC # FLD AUTO: 13.95 K/UL — HIGH (ref 3.8–10.5)

## 2019-06-07 RX ORDER — IRON SUCROSE 20 MG/ML
200 INJECTION, SOLUTION INTRAVENOUS ONCE
Refills: 0 | Status: COMPLETED | OUTPATIENT
Start: 2019-06-07 | End: 2019-06-07

## 2019-06-07 RX ADMIN — Medication 325 MILLIGRAM(S): at 21:26

## 2019-06-07 RX ADMIN — OXYCODONE HYDROCHLORIDE 10 MILLIGRAM(S): 5 TABLET ORAL at 23:12

## 2019-06-07 RX ADMIN — Medication 975 MILLIGRAM(S): at 00:04

## 2019-06-07 RX ADMIN — Medication 25 MILLIGRAM(S): at 17:59

## 2019-06-07 RX ADMIN — Medication 1 TABLET(S): at 12:25

## 2019-06-07 RX ADMIN — PANTOPRAZOLE SODIUM 40 MILLIGRAM(S): 20 TABLET, DELAYED RELEASE ORAL at 07:40

## 2019-06-07 RX ADMIN — IRON SUCROSE 110 MILLIGRAM(S): 20 INJECTION, SOLUTION INTRAVENOUS at 12:26

## 2019-06-07 RX ADMIN — Medication 25 MILLIGRAM(S): at 12:25

## 2019-06-07 RX ADMIN — Medication 325 MILLIGRAM(S): at 05:54

## 2019-06-07 RX ADMIN — SIMETHICONE 80 MILLIGRAM(S): 80 TABLET, CHEWABLE ORAL at 17:59

## 2019-06-07 RX ADMIN — SIMETHICONE 80 MILLIGRAM(S): 80 TABLET, CHEWABLE ORAL at 12:25

## 2019-06-07 RX ADMIN — Medication 25 MILLIGRAM(S): at 00:04

## 2019-06-07 RX ADMIN — OXYCODONE HYDROCHLORIDE 10 MILLIGRAM(S): 5 TABLET ORAL at 02:20

## 2019-06-07 RX ADMIN — Medication 25 MILLIGRAM(S): at 22:30

## 2019-06-07 RX ADMIN — Medication 975 MILLIGRAM(S): at 22:30

## 2019-06-07 RX ADMIN — Medication 100 MILLIGRAM(S): at 05:53

## 2019-06-07 RX ADMIN — SIMETHICONE 80 MILLIGRAM(S): 80 TABLET, CHEWABLE ORAL at 23:11

## 2019-06-07 RX ADMIN — SIMETHICONE 80 MILLIGRAM(S): 80 TABLET, CHEWABLE ORAL at 05:53

## 2019-06-07 RX ADMIN — Medication 975 MILLIGRAM(S): at 06:32

## 2019-06-07 RX ADMIN — Medication 325 MILLIGRAM(S): at 15:07

## 2019-06-07 RX ADMIN — Medication 975 MILLIGRAM(S): at 21:27

## 2019-06-07 RX ADMIN — Medication 25 MILLIGRAM(S): at 21:27

## 2019-06-07 RX ADMIN — Medication 975 MILLIGRAM(S): at 15:07

## 2019-06-07 RX ADMIN — Medication 500 MILLIGRAM(S): at 12:25

## 2019-06-07 RX ADMIN — OXYCODONE HYDROCHLORIDE 10 MILLIGRAM(S): 5 TABLET ORAL at 03:22

## 2019-06-07 RX ADMIN — SIMETHICONE 80 MILLIGRAM(S): 80 TABLET, CHEWABLE ORAL at 02:08

## 2019-06-07 RX ADMIN — Medication 975 MILLIGRAM(S): at 05:54

## 2019-06-07 NOTE — CONSULT NOTE ADULT - ASSESSMENT
28 year old obese female with pmhx of anemia and leiomyoma of uterus presents with c/o dysmenorrhea, menorrhagia and abnormal vaginal bleeding x 3years, sinus tachycardia,anemia.  1.Echocardiogram.  2.H/H-s/p transfusion.  3.IV Iron.  4.GYN f/u.  5.PPI.

## 2019-06-07 NOTE — PROGRESS NOTE ADULT - SUBJECTIVE AND OBJECTIVE BOX
POD 2  s/p 2 u prbc POD 0 and 3 u prbc last night  Feels well now. No dizziness. No SOB  Pos BM    vss afeb at 5 AM today  abd:c/d/i, ND  ext:NT    a/p:stable. check cbc

## 2019-06-07 NOTE — CONSULT NOTE ADULT - SUBJECTIVE AND OBJECTIVE BOX
CHIEF COMPLAINT:Patient is a 28y old  Female who presents with a chief complaint of scheduled abdominal myomectomy POD#2 with acute blood loss anemia (07 Jun 2019 08:21)      HPI:  28 year old obese female with pmhx of anemia and leiomyoma of uterus presents with c/o dysmenorrhea, menorrhagia and abnormal vaginal bleeding x 3years. Patient is here today for presurgical testing for scheduled Abdominal Myomectomy on 6/5/19 .Pt had sinus tachycardia in 140's due to acute anemia.      PAST MEDICAL & SURGICAL HISTORY:  Anemia  Obesity (BMI 30.0-34.9)  Leiomyoma of uterus, unspecified        MEDICATIONS  (STANDING):  acetaminophen   Tablet .. 975 milliGRAM(s) Oral every 8 hours  ascorbic acid 500 milliGRAM(s) Oral daily  docusate sodium 100 milliGRAM(s) Oral two times a day  ferrous    sulfate 325 milliGRAM(s) Oral three times a day  indomethacin 25 milliGRAM(s) Oral <User Schedule>  lactated ringers. 1000 milliLiter(s) (125 mL/Hr) IV Continuous <Continuous>  magnesium hydroxide Suspension 30 milliLiter(s) Oral daily  multivitamin 1 Tablet(s) Oral daily  pantoprazole    Tablet 40 milliGRAM(s) Oral before breakfast  senna 2 Tablet(s) Oral at bedtime  simethicone 80 milliGRAM(s) Chew every 6 hours    MEDICATIONS  (PRN):  oxyCODONE    IR 5 milliGRAM(s) Oral every 3 hours PRN Moderate Pain (4 - 6)  oxyCODONE    IR 10 milliGRAM(s) Oral every 4 hours PRN Severe Pain (7 - 10)  zolpidem 5 milliGRAM(s) Oral at bedtime PRN Insomnia  zolpidem 5 milliGRAM(s) Oral at bedtime PRN Insomnia      FAMILY HISTORY:  History of hypertension (Mother)      SOCIAL HISTORY:    [x ] Non-smoker    [x ] Alcohol-denies    Allergies    No Known Allergies    Intolerances    	    REVIEW OF SYSTEMS:  CONSTITUTIONAL: No fever, weight loss, or fatigue  EYES: No eye pain, visual disturbances, or discharge  ENT:  No difficulty hearing, tinnitus, vertigo; No sinus or throat pain  NECK: No pain or stiffness  RESPIRATORY: No cough, wheezing, chills or hemoptysis; No Shortness of Breath  CARDIOVASCULAR: No chest pain, +palpitations  GASTROINTESTINAL: No abdominal or epigastric pain. No nausea, vomiting, or hematemesis; No diarrhea or constipation. No melena or hematochezia.  GENITOURINARY: No dysuria, frequency, hematuria, or incontinence  NEUROLOGICAL: No headaches, memory loss, loss of strength, numbness, or tremors  SKIN: No itching, burning, rashes, or lesions   LYMPH Nodes: No enlarged glands  ENDOCRINE: No heat or cold intolerance; No hair loss  MUSCULOSKELETAL: No joint pain or swelling; No muscle, back, or extremity pain  PSYCHIATRIC: No depression, anxiety, mood swings, or difficulty sleeping  HEME/LYMPH: No easy bruising, or bleeding gums  ALLERGY AND IMMUNOLOGIC: No hives or eczema	        PHYSICAL EXAM:  T(C): 36.8 (06-07-19 @ 05:51), Max: 37.6 (06-06-19 @ 21:27)  HR: 99 (06-07-19 @ 05:51) (80 - 134)  BP: 113/66 (06-07-19 @ 05:51) (109/53 - 131/57)  RR: 18 (06-07-19 @ 05:51) (16 - 23)  SpO2: 95% (06-07-19 @ 05:51) (95% - 100%)    I&O's Summary    06 Jun 2019 07:01  -  07 Jun 2019 07:00  --------------------------------------------------------  IN: 0 mL / OUT: 350 mL / NET: -350 mL        Appearance: Normal	  HEENT:   Normal oral mucosa, PERRL, EOMI	  Lymphatic: No lymphadenopathy  Cardiovascular: Normal S1 S2, No JVD, No murmurs, No edema  Respiratory: Lungs clear to auscultation	  Psychiatry: A & O x 3, Mood & affect appropriate  Gastrointestinal:  Soft, Non-tender, + BS	  Skin: No rashes, No ecchymoses, No cyanosis	  Neurologic: Non-focal  Extremities: Normal range of motion, No clubbing, cyanosis or edema  Vascular: Peripheral pulses palpable 2+ bilaterally    	    ECG:  	sinus tachycardia,nl axis    	  LABS:	 	                       6.1    14.37 )-----------( 144      ( 06 Jun 2019 20:31 )             17.8     06-06    140  |  109<H>  |  10  ----------------------------<  127<H>  4.0   |  24  |  0.87    Ca    7.4<L>      06 Jun 2019 06:36      TSH: Thyroid Stimulating Hormone, Serum: 1.01 uU/mL (06-06 @ 20:31)        EXAM:  CT ANGIO CHEST (W)AW IC                            PROCEDURE DATE:  06/06/2019          INTERPRETATION:  CLINICAL INFORMATION: One day status post myomectomy.   Tachycardic intrahepatic.    TECHNIQUE: CT pulmonary angiogram was performed according to standard   departmental protocol 56 mls of Omnipaque 350 was administered without   complication and 44 mls were discarded. Coronal and sagittal reformats   and axial MIP reformats were additionally performed.    COMPARISON: None    FINDINGS:    Vascular: There is suboptimal opacification of the pulmonary arteries.   Images are also degraded due to artifact. There is no evidence of central   pulmonary embolism. Evaluation of subsegmental pulmonary arteries is   suboptimal. No signs of aortic dissection. There is no aortic aneurysm.    Mediastinum: No significant mediastinal, axillary or hilar   lymphadenopathy is identified. The heart size is within normal limits. No   pericardial effusion is seen.    Lungs: Subsegmental atelectatic changes and/or scarring is identified in   the lower lobes.. No pleural effusion is seen.    Upper abdomen: The visualized upper abdominal organs are unremarkable   inclusive of the adrenal glands.    Bones: Visualized osseous structures demonstrate no significant   abnormality.    Miscellaneous: The visualized thyroid is unremarkable.    IMPRESSION: Suboptimal opacification of the pulmonary arteries. No   evidence of central pulmonary embolism. Clinical evaluation and/or VQ   scan or repeat of the examination is recommended as clinically indicated.   Bilateral lower lobe subsegmental atelectatic atelectatic changes center   scarring.    A preliminary report was rendered by David River MD of [facility] at   9:53 PM on 6/6/2019    I discussed this case with NEGRA Betancourt at 8:48 AM on 6/7/2019.  Hospital   policies for call back including read back policy were followed.  [facility] RADIOLOGIST PRELIMINARY REPORT

## 2019-06-07 NOTE — PROGRESS NOTE ADULT - SUBJECTIVE AND OBJECTIVE BOX
28y  [facility] no new [facility]. + Ambulation, voiding without difficulty, + flatus; on fluid diet denies HA, CP, SOB, N/V/D,  no bm; dizziness, palpitations, worsening abdominal pain, worsening vaginal bleeding, or any other concerns.     Vital Signs Last 24 Hrs  T(C): 36.8 (07 Jun 2019 05:51), Max: 37.6 (06 Jun 2019 21:27)  T(F): 98.2 (07 Jun 2019 05:51), Max: 99.6 (06 Jun 2019 21:27)  HR: 99 (07 Jun 2019 05:51) (80 - 134)  BP: 113/66 (07 Jun 2019 05:51) (109/53 - 131/57)  BP(mean): --  RR: 18 (07 Jun 2019 05:51) (16 - 23)  SpO2: 95% (07 Jun 2019 05:51) (95% - 100%)    Gen: A&O x 3, NAD  Chest: CTA B/L  Cardiac: S1,S2  RRR  Abdomen: +BS; soft; Nontender, nondistended  Gyn: minimal vaginal bleeding, changed pads 5x on 6/6/19 with saturation equal to first day of menses  Extremities: Nontender, no worsening edema                          6.1    14.37 )-----------( 144      ( 06 Jun 2019 20:31 )             17.8     06-06    140  |  109<H>  |  10  ----------------------------<  127<H>  4.0   |  24  |  0.87    Ca    7.4<L>      06 Jun 2019 06:36        A/P: POD # s/p  -cont pain management  -follow up cbc at 9:30am  -advance diet to regular after flatus  -oob, encourage ambulation after cbc check  -leg doppler pending   -Dr Rm to see patient  -d/w 28y  [facility] offers c/o  lightheadedness but dizziness improved and no further SOB. + Ambulation, voiding without difficulty, + flatus. Pt states changed pads 5x on 6/6/19 with saturation equal to first day of menses; tolerateing clear liquid diet. denies HA, CP, SOB, N/V/D,  no bm; dizziness, palpitations, worsening abdominal pain, worsening vaginal bleeding, or any other concerns.     Vital Signs Last 24 Hrs  T(C): 36.8 (07 Jun 2019 05:51), Max: 37.6 (06 Jun 2019 21:27)  T(F): 98.2 (07 Jun 2019 05:51), Max: 99.6 (06 Jun 2019 21:27)  HR: 99 (07 Jun 2019 05:51) (80 - 134)  BP: 113/66 (07 Jun 2019 05:51) (109/53 - 131/57)  BP(mean): --  RR: 18 (07 Jun 2019 05:51) (16 - 23)  SpO2: 95% (07 Jun 2019 05:51) (95% - 100%)    Gen: A&O x 3, NAD  Chest: CTA B/L  Cardiac: S1,S2  RRR  Abdomen: +BS; soft; Nontender, nondistended  Gyn: moderate vaginal bleeding,   Extremities: Nontender, no worsening edema                          6.1    14.37 )-----------( 144      ( 06 Jun 2019 20:31 )             17.8     06-06    140  |  109<H>  |  10  ----------------------------<  127<H>  4.0   |  24  |  0.87    Ca    7.4<L>      06 Jun 2019 06:36        A/P: POD # 2 s/p abdominal myomectomy with acute  blood loss anemia  s/p blood transfusion 3 PRBC and 1 ffp  -cont pain management  -follow up cbc at 9:30am  -advance diet to regular after flatus  -oob, encourage ambulation after cbc check  -leg doppler pending   -Dr Rm to see patient

## 2019-06-08 VITALS — WEIGHT: 120.81 LBS

## 2019-06-08 LAB
BASOPHILS # BLD AUTO: 0.04 K/UL — SIGNIFICANT CHANGE UP (ref 0–0.2)
BASOPHILS NFR BLD AUTO: 0.3 % — SIGNIFICANT CHANGE UP (ref 0–2)
EOSINOPHIL # BLD AUTO: 0.32 K/UL — SIGNIFICANT CHANGE UP (ref 0–0.5)
EOSINOPHIL NFR BLD AUTO: 2.5 % — SIGNIFICANT CHANGE UP (ref 0–6)
HCT VFR BLD CALC: 30 % — LOW (ref 34.5–45)
HGB BLD-MCNC: 10.3 G/DL — LOW (ref 11.5–15.5)
IMM GRANULOCYTES NFR BLD AUTO: 0.5 % — SIGNIFICANT CHANGE UP (ref 0–1.5)
LYMPHOCYTES # BLD AUTO: 17.6 % — SIGNIFICANT CHANGE UP (ref 13–44)
LYMPHOCYTES # BLD AUTO: 2.21 K/UL — SIGNIFICANT CHANGE UP (ref 1–3.3)
MCHC RBC-ENTMCNC: 27.5 PG — SIGNIFICANT CHANGE UP (ref 27–34)
MCHC RBC-ENTMCNC: 34.3 GM/DL — SIGNIFICANT CHANGE UP (ref 32–36)
MCV RBC AUTO: 80 FL — SIGNIFICANT CHANGE UP (ref 80–100)
MONOCYTES # BLD AUTO: 0.71 K/UL — SIGNIFICANT CHANGE UP (ref 0–0.9)
MONOCYTES NFR BLD AUTO: 5.6 % — SIGNIFICANT CHANGE UP (ref 2–14)
NEUTROPHILS # BLD AUTO: 9.24 K/UL — HIGH (ref 1.8–7.4)
NEUTROPHILS NFR BLD AUTO: 73.5 % — SIGNIFICANT CHANGE UP (ref 43–77)
NRBC # BLD: 0 /100 WBCS — SIGNIFICANT CHANGE UP (ref 0–0)
PLATELET # BLD AUTO: 208 K/UL — SIGNIFICANT CHANGE UP (ref 150–400)
RBC # BLD: 3.75 M/UL — LOW (ref 3.8–5.2)
RBC # FLD: 17 % — HIGH (ref 10.3–14.5)
WBC # BLD: 12.58 K/UL — HIGH (ref 3.8–10.5)
WBC # FLD AUTO: 12.58 K/UL — HIGH (ref 3.8–10.5)

## 2019-06-08 PROCEDURE — 36430 TRANSFUSION BLD/BLD COMPNT: CPT

## 2019-06-08 PROCEDURE — 86850 RBC ANTIBODY SCREEN: CPT

## 2019-06-08 PROCEDURE — 93005 ELECTROCARDIOGRAM TRACING: CPT

## 2019-06-08 PROCEDURE — 84443 ASSAY THYROID STIM HORMONE: CPT

## 2019-06-08 PROCEDURE — P9040: CPT

## 2019-06-08 PROCEDURE — 86900 BLOOD TYPING SEROLOGIC ABO: CPT

## 2019-06-08 PROCEDURE — C1765: CPT

## 2019-06-08 PROCEDURE — 86923 COMPATIBILITY TEST ELECTRIC: CPT

## 2019-06-08 PROCEDURE — 93306 TTE W/DOPPLER COMPLETE: CPT | Mod: 26

## 2019-06-08 PROCEDURE — P9059: CPT

## 2019-06-08 PROCEDURE — 86901 BLOOD TYPING SEROLOGIC RH(D): CPT

## 2019-06-08 PROCEDURE — 80048 BASIC METABOLIC PNL TOTAL CA: CPT

## 2019-06-08 PROCEDURE — 71275 CT ANGIOGRAPHY CHEST: CPT

## 2019-06-08 PROCEDURE — 81025 URINE PREGNANCY TEST: CPT

## 2019-06-08 PROCEDURE — 36415 COLL VENOUS BLD VENIPUNCTURE: CPT

## 2019-06-08 PROCEDURE — 93306 TTE W/DOPPLER COMPLETE: CPT

## 2019-06-08 PROCEDURE — 85027 COMPLETE CBC AUTOMATED: CPT

## 2019-06-08 RX ORDER — FERROUS SULFATE 325(65) MG
1 TABLET ORAL
Qty: 0 | Refills: 0 | DISCHARGE

## 2019-06-08 RX ORDER — FERROUS SULFATE 325(65) MG
1 TABLET ORAL
Qty: 30 | Refills: 0
Start: 2019-06-08 | End: 2019-07-07

## 2019-06-08 RX ORDER — SENNA PLUS 8.6 MG/1
2 TABLET ORAL
Qty: 40 | Refills: 0
Start: 2019-06-08 | End: 2019-06-27

## 2019-06-08 RX ORDER — DOCUSATE SODIUM 100 MG
1 CAPSULE ORAL
Qty: 40 | Refills: 0
Start: 2019-06-08 | End: 2019-06-27

## 2019-06-08 RX ORDER — IBUPROFEN 200 MG
1 TABLET ORAL
Qty: 40 | Refills: 0
Start: 2019-06-08 | End: 2019-06-17

## 2019-06-08 RX ADMIN — Medication 975 MILLIGRAM(S): at 06:30

## 2019-06-08 RX ADMIN — OXYCODONE HYDROCHLORIDE 10 MILLIGRAM(S): 5 TABLET ORAL at 11:21

## 2019-06-08 RX ADMIN — OXYCODONE HYDROCHLORIDE 10 MILLIGRAM(S): 5 TABLET ORAL at 12:13

## 2019-06-08 RX ADMIN — Medication 325 MILLIGRAM(S): at 05:35

## 2019-06-08 RX ADMIN — PANTOPRAZOLE SODIUM 40 MILLIGRAM(S): 20 TABLET, DELAYED RELEASE ORAL at 07:59

## 2019-06-08 RX ADMIN — Medication 1 TABLET(S): at 11:21

## 2019-06-08 RX ADMIN — OXYCODONE HYDROCHLORIDE 10 MILLIGRAM(S): 5 TABLET ORAL at 00:10

## 2019-06-08 RX ADMIN — Medication 500 MILLIGRAM(S): at 11:21

## 2019-06-08 RX ADMIN — OXYCODONE HYDROCHLORIDE 10 MILLIGRAM(S): 5 TABLET ORAL at 06:30

## 2019-06-08 RX ADMIN — OXYCODONE HYDROCHLORIDE 10 MILLIGRAM(S): 5 TABLET ORAL at 05:33

## 2019-06-08 RX ADMIN — SIMETHICONE 80 MILLIGRAM(S): 80 TABLET, CHEWABLE ORAL at 05:34

## 2019-06-08 RX ADMIN — Medication 975 MILLIGRAM(S): at 05:34

## 2019-06-08 NOTE — DISCHARGE NOTE PROVIDER - NSDCCPCAREPLAN_GEN_ALL_CORE_FT
PRINCIPAL DISCHARGE DIAGNOSIS  Diagnosis: S/P myomectomy  Assessment and Plan of Treatment: Take motrin for pain as needed. Nothing in vagina for 6 weeks, no sex, no tampons. Avoid strenous activity, no heavy lifting,  no pushing,  ambulation daily as tolerated. Shower daily, clean wound well and keep dry after. Follow up with your gynecologist in 1-2 weeks      SECONDARY DISCHARGE DIAGNOSES  Diagnosis: Anemia due to blood loss  Assessment and Plan of Treatment: Continue with iron po supplement

## 2019-06-08 NOTE — PROGRESS NOTE ADULT - PROBLEM SELECTOR PLAN 1
POD 3, clinically stable for discharge  Discharge instructions given, patient verbalizes understanding  follow up on thursday for follow up check and staple removal  prescriptions sent to pharmacy  pain management prn  encourage ambulation and IS

## 2019-06-08 NOTE — DIETITIAN INITIAL EVALUATION ADULT. - OTHER INFO
nutrition assessment for NPO/clear liquid diet status x 3 to 4d; lives home PTA; no pressure ulcer; s/p abdominal myomectomy with acute blood loss; denied GI distress, chewing or swallowing problem at present, no specific food choices, tolerating diet well reported

## 2019-06-08 NOTE — DISCHARGE NOTE PROVIDER - CARE PROVIDER_API CALL
Dennys Armendariz)  Obstetrics and Gynecology  [address] [id number]  Phone: [phone]  Fax: [phone]  Follow Up Time:

## 2019-06-08 NOTE — DIETITIAN INITIAL EVALUATION ADULT. - PERTINENT MEDS FT
reviewed   MEDICATIONS  (STANDING):  acetaminophen   Tablet .. 975 milliGRAM(s) Oral every 8 hours  ascorbic acid 500 milliGRAM(s) Oral daily  docusate sodium 100 milliGRAM(s) Oral two times a day  ferrous    sulfate 325 milliGRAM(s) Oral three times a day  lactated ringers. 1000 milliLiter(s) (125 mL/Hr) IV Continuous <Continuous>  magnesium hydroxide Suspension 30 milliLiter(s) Oral daily  multivitamin 1 Tablet(s) Oral daily  pantoprazole    Tablet 40 milliGRAM(s) Oral before breakfast  senna 2 Tablet(s) Oral at bedtime  simethicone 80 milliGRAM(s) Chew every 6 hours    MEDICATIONS  (PRN):  oxyCODONE    IR 5 milliGRAM(s) Oral every 3 hours PRN Moderate Pain (4 - 6)  oxyCODONE    IR 10 milliGRAM(s) Oral every 4 hours PRN Severe Pain (7 - 10)  zolpidem 5 milliGRAM(s) Oral at bedtime PRN Insomnia  zolpidem 5 milliGRAM(s) Oral at bedtime PRN Insomnia

## 2019-06-08 NOTE — DISCHARGE NOTE PROVIDER - HOSPITAL COURSE
Patient admitted for scheduled procedure    s/p abdominal myomectomy    complicated by acute blood loss anemia intra-op    s/p  2 units PRBC immediately post op;  and given an additional 3 units PRBC and 1 FFP post op day 2     discharged home POD 3, clinically stable

## 2019-06-08 NOTE — PROGRESS NOTE ADULT - ASSESSMENT
28 year old obese female with pmhx of anemia and leiomyoma of uterus presents with c/o dysmenorrhea, menorrhagia and abnormal vaginal bleeding x 3years, sinus tachycardia,anemia.  1.Echocardiogram.  2.H/H-s/p transfusion.  3.Anemia-Iron.  4.GYN f/u.  5.PPI.

## 2019-06-08 NOTE — DISCHARGE NOTE PROVIDER - NSDCACTIVITY_GEN_ALL_CORE
Walking - Outdoors allowed/Showering allowed/Walking - Indoors allowed/No heavy lifting/straining/Driving allowed/Stairs allowed

## 2019-06-08 NOTE — DISCHARGE NOTE PROVIDER - NSDCFUADDINST_GEN_ALL_CORE_FT
Take motrin for pain as needed. Nothing in vagina for 6 weeks, no sex, no tampons. Avoid strenous activity, no heavy lifting,  no pushing,  ambulation daily as tolerated. Shower daily, clean wound well and keep dry after. Follow up with your gynecologist in 1 week for staple removal.

## 2019-06-08 NOTE — PROGRESS NOTE ADULT - SUBJECTIVE AND OBJECTIVE BOX
CHIEF COMPLAINT:Patient is a 28y old  Female who presents with a chief complaint of scheduled procedure: abdominal myomectomy.Pt appears comfortable.    	  REVIEW OF SYSTEMS:  CONSTITUTIONAL: No fever, weight loss, or fatigue  EYES: No eye pain, visual disturbances, or discharge  ENT:  No difficulty hearing, tinnitus, vertigo; No sinus or throat pain  NECK: No pain or stiffness  RESPIRATORY: No cough, wheezing, chills or hemoptysis; No Shortness of Breath  CARDIOVASCULAR: No chest pain, palpitations, passing out, dizziness, or leg swelling  GASTROINTESTINAL: No abdominal or epigastric pain. No nausea, vomiting, or hematemesis; No diarrhea or constipation. No melena or hematochezia.  GENITOURINARY: No dysuria, frequency, hematuria, or incontinence  NEUROLOGICAL: No headaches, memory loss, loss of strength, numbness, or tremors  SKIN: No itching, burning, rashes, or lesions   LYMPH Nodes: No enlarged glands  ENDOCRINE: No heat or cold intolerance; No hair loss  MUSCULOSKELETAL: No joint pain or swelling; No muscle, back, or extremity pain  PSYCHIATRIC: No depression, anxiety, mood swings, or difficulty sleeping  HEME/LYMPH: No easy bruising, or bleeding gums  ALLERGY AND IMMUNOLOGIC: No hives or eczema	      PHYSICAL EXAM:  T(C): 36.7 (06-08-19 @ 05:05), Max: 37.1 (06-07-19 @ 21:45)  HR: 97 (06-08-19 @ 05:05) (94 - 107)  BP: 124/79 (06-08-19 @ 05:05) (120/75 - 141/82)  RR: 16 (06-08-19 @ 05:05) (16 - 18)  SpO2: 100% (06-08-19 @ 05:05) (100% - 100%)    Appearance: Normal	  HEENT:   Normal oral mucosa, PERRL, EOMI	  Lymphatic: No lymphadenopathy  Cardiovascular: Normal S1 S2, No JVD, No murmurs, No edema  Respiratory: Lungs clear to auscultation	  Psychiatry: A & O x 3, Mood & affect appropriate  Gastrointestinal:  Soft, Non-tender, + BS	  Skin: No rashes, No ecchymoses, No cyanosis	  Neurologic: Non-focal  Extremities: Normal range of motion, No clubbing, cyanosis or edema  Vascular: Peripheral pulses palpable 2+ bilaterally    MEDICATIONS  (STANDING):  acetaminophen   Tablet .. 975 milliGRAM(s) Oral every 8 hours  ascorbic acid 500 milliGRAM(s) Oral daily  docusate sodium 100 milliGRAM(s) Oral two times a day  ferrous    sulfate 325 milliGRAM(s) Oral three times a day  lactated ringers. 1000 milliLiter(s) (125 mL/Hr) IV Continuous <Continuous>  magnesium hydroxide Suspension 30 milliLiter(s) Oral daily  multivitamin 1 Tablet(s) Oral daily  pantoprazole    Tablet 40 milliGRAM(s) Oral before breakfast  senna 2 Tablet(s) Oral at bedtime  simethicone 80 milliGRAM(s) Chew every 6 hours        	  LABS:	 	                      10.3   12.58 )-----------( 208      ( 08 Jun 2019 06:53 )             30.0           TSH: Thyroid Stimulating Hormone, Serum: 1.01 uU/mL (06-06 @ 20:31)

## 2019-06-08 NOTE — PROGRESS NOTE ADULT - SUBJECTIVE AND OBJECTIVE BOX
POD 3 s/p abd myomectomy  [facility] + Ambulation, voiding without difficulty, + flatus; tolerating regular diet. Denies HA, CP, SOB, N/V/D,  no bm; dizziness, palpitations, worsening abdominal pain, worsening vaginal bleeding, or any other concerns.     Vital Signs Last 24 Hrs  T(C): 36.7 (08 Jun 2019 05:05), Max: 37.1 (07 Jun 2019 21:45)  T(F): 98.1 (08 Jun 2019 05:05), Max: 98.7 (07 Jun 2019 21:45)  HR: 97 (08 Jun 2019 05:05) (94 - 107)  BP: 124/79 (08 Jun 2019 05:05) (120/75 - 141/82)  RR: 16 (08 Jun 2019 05:05) (16 - 18)  SpO2: 100% (08 Jun 2019 05:05) (100% - 100%)    Gen: A&O x 3, NAD  Chest: CTA B/L  Cardiac: S1,S2  RRR  Abdomen: +BS; soft; Nontender, nondistended  Gyn: no vaginal bleeding   Extremities: Nontender, no worsening edema                          10.3   12.58 )-----------( 208      ( 08 Jun 2019 06:53 )             30.0

## 2019-06-08 NOTE — PROGRESS NOTE ADULT - SUBJECTIVE AND OBJECTIVE BOX
POD 3  No c/o  Pos BM    vss afeb at 5 AM today  abd:c/d/i, ND  ext:NT    hg 10.3 today    a/p:stable. d/c home today and see me on 6/13/19. iron daily

## 2019-06-08 NOTE — DISCHARGE NOTE NURSING/CASE MANAGEMENT/SOCIAL WORK - NSDCDPATPORTLINK_GEN_ALL_CORE
You can access the [facility], offered by [facility], by registering with the following website: [email or web address]

## 2019-06-10 LAB — SURGICAL PATHOLOGY STUDY: SIGNIFICANT CHANGE UP

## 2023-03-13 NOTE — PATIENT PROFILE ADULT - NSPROGENARRIVEDFROM_GEN_A_NUR
home Ivermectin Counseling:  Patient instructed to take medication on an empty stomach with a full glass of water.  Patient informed of potential adverse effects including but not limited to nausea, diarrhea, dizziness, itching, and swelling of the extremities or lymph nodes.  The patient verbalized understanding of the proper use and possible adverse effects of ivermectin.  All of the patient's questions and concerns were addressed.

## 2023-08-15 NOTE — H&P PST ADULT - NSWEIGHTCALCTOOLDRUG_GEN_A_CORE
Body mass index is 49.84 kg/m². Morbid obesity complicates all aspects of disease management from diagnostic modalities to treatment. Weight loss encouraged and health benefits explained to patient.     used